# Patient Record
Sex: MALE | Race: WHITE | NOT HISPANIC OR LATINO | Employment: PART TIME | URBAN - METROPOLITAN AREA
[De-identification: names, ages, dates, MRNs, and addresses within clinical notes are randomized per-mention and may not be internally consistent; named-entity substitution may affect disease eponyms.]

---

## 2023-06-14 ENCOUNTER — TRANSCRIBE ORDERS (OUTPATIENT)
Dept: RESPIRATORY THERAPY | Facility: CLINIC | Age: 38
End: 2023-06-14
Payer: OTHER GOVERNMENT

## 2023-06-14 DIAGNOSIS — J98.4 RESTRICTIVE LUNG DISEASE: Primary | ICD-10-CM

## 2023-07-28 ENCOUNTER — HOSPITAL ENCOUNTER (OUTPATIENT)
Dept: RESPIRATORY THERAPY | Facility: CLINIC | Age: 38
Discharge: HOME | End: 2023-07-28
Attending: PHYSICAL MEDICINE & REHABILITATION
Payer: OTHER GOVERNMENT

## 2023-07-28 ENCOUNTER — HOSPITAL ENCOUNTER (OUTPATIENT)
Dept: RADIOLOGY | Facility: CLINIC | Age: 38
Discharge: HOME | End: 2023-07-28
Attending: PHYSICAL MEDICINE & REHABILITATION
Payer: OTHER GOVERNMENT

## 2023-07-28 DIAGNOSIS — J98.4 RESTRICTIVE LUNG DISEASE: ICD-10-CM

## 2023-07-28 LAB
EXPTIME-PRE: 8.42 SEC
FEF2575-%PRED-POST: 89 %
FEF2575-%PRED-PRE: 63 %
FEF2575-POST: 3.89 L/SEC
FEF2575-PRE: 2.77 L/SEC
FEF2575-PRED: 4.33 L/SEC
FEFMAX-%PRED-PRE: 71 %
FEFMAX-PRE: 7.9 L/SEC
FEFMAX-PRED: 11.12 L/SEC
FEV1-%PRED-PRE: 84 %
FEV1-PRE: 3.77 L
FEV1FEV6-PRE: 73 %
FEV1FEV6-PRED: 82 %
FEV1FVC-PRE: 73 %
FEV1FVC-PRED: 81 %
FIFMAX-PRE: 3.93 L/SEC
FVC-%PRED-PRE: 93 %
FVC-PRE: 5.19 L
FVC-PRED: 5.55 L

## 2023-07-28 PROCEDURE — 250N000009 HC RX 250

## 2023-07-28 PROCEDURE — 94060 EVALUATION OF WHEEZING: CPT

## 2023-07-28 PROCEDURE — 999N000157 HC STATISTIC RCP TIME EA 10 MIN

## 2023-07-28 PROCEDURE — 71046 X-RAY EXAM CHEST 2 VIEWS: CPT

## 2023-07-28 RX ORDER — ALBUTEROL SULFATE 0.83 MG/ML
2.5 SOLUTION RESPIRATORY (INHALATION) ONCE
Status: COMPLETED | OUTPATIENT
Start: 2023-07-28 | End: 2023-07-28

## 2023-07-28 RX ADMIN — ALBUTEROL SULFATE 2.5 MG: 2.5 SOLUTION RESPIRATORY (INHALATION) at 07:20

## 2023-07-28 NOTE — PROGRESS NOTES
RESPIRATORY CARE NOTE    Pre and Post Spirometry Pulmonary Function Test completed by patient.  Good patient effort and cooperation. Albuterol 2.5 mg neb given for bronchodilation.  The results of this test meet the ATS standards for acceptability and repeatability. PT returned to baseline and left in no distress.    Radha Sanchez, RT  7/28/2023

## 2023-09-10 ENCOUNTER — HEALTH MAINTENANCE LETTER (OUTPATIENT)
Age: 38
End: 2023-09-10

## 2023-09-14 ENCOUNTER — MEDICAL CORRESPONDENCE (OUTPATIENT)
Dept: HEALTH INFORMATION MANAGEMENT | Facility: CLINIC | Age: 38
End: 2023-09-14
Payer: OTHER GOVERNMENT

## 2023-09-25 ENCOUNTER — LAB (OUTPATIENT)
Dept: LAB | Facility: CLINIC | Age: 38
End: 2023-09-25
Payer: OTHER GOVERNMENT

## 2023-09-25 DIAGNOSIS — E55.9 VITAMIN D DEFICIENCY: Primary | ICD-10-CM

## 2023-09-25 LAB — DEPRECATED CALCIDIOL+CALCIFEROL SERPL-MC: 26 UG/L (ref 20–75)

## 2023-09-25 PROCEDURE — 36415 COLL VENOUS BLD VENIPUNCTURE: CPT

## 2023-09-25 PROCEDURE — 82306 VITAMIN D 25 HYDROXY: CPT

## 2024-11-03 ENCOUNTER — HEALTH MAINTENANCE LETTER (OUTPATIENT)
Age: 39
End: 2024-11-03